# Patient Record
(demographics unavailable — no encounter records)

---

## 2024-12-24 NOTE — HISTORY OF PRESENT ILLNESS
[FreeTextEntry1] : Patient is a 55-year-old male who presents to the gastroenterology office for evaluation.  Patient reports he has been experiencing intermittent chest pain.  He reports having been seen in the hospital emergency department on multiple occasions for his chest pain.  He reports previously having undergone cardiac evaluation, which he reports were normal in the past.  He reports starting omeprazole 20 mg once a day and more recently famotidine 20 mg once a day at the advice of his primary care physician, which he obtained over-the-counter.  He reports there has been a partial improvement in his chest pain with the omeprazole and famotidine, however he has still had several episodes of breakthrough symptom of chest pain.  He denies abdominal pain, shortness of breath, nausea, vomiting, heartburn, regurgitation, difficulty swallowing or painful swallowing.  He denies acute changes in bowel habits.  He reports typically having 1-2 bowel movements per day.  He denies blood in stools and denies black stools.  He reports typically undergoing yearly Cologuard stool test ordered by his primary care physician for colon cancer screening.  He reports his most recent Cologuard stool test was completed in October 2024 and was normal.  He denies unintentional weight loss.  Past medical history: Patient reports history of hypertension. Patient reports history of hypothyroidism.

## 2024-12-24 NOTE — ASSESSMENT
[FreeTextEntry1] : Patient is a 55-year-old male with symptom of chest pain.  He reported previously having undergone cardiac evaluation, which he reported were normal in the past.  His chest pain has overall improved with over-the-counter omeprazole 20 mg once a day and famotidine 20 mg once a day, however he still has had several episodes of breakthrough symptom of chest pain.  From GI perspective, would recommend EGD (upper GI endoscopy) for evaluation.  Discussed indication, benefits/risks and alternatives to EGD with the patient.  He reported understanding and he is in agreement with proceeding with EGD.  All of his questions were answered. Will increase patient's omeprazole to 40 mg once a day at present time.  Patient advised to discontinue use of over-the-counter omeprazole 20 mg once a day and famotidine 20 mg once a day, since a prescription for omeprazole 40 mg once a day will be ordered to his pharmacy. Patient was counseled on GERD dietary/lifestyle modifications.       --Increase omeprazole to 40 mg once a day.        --Discontinue over-the-counter famotidine.       --Schedule EGD (upper GI endoscopy).       --GERD dietary/lifestyle modifications.

## 2024-12-24 NOTE — PHYSICAL EXAM
[Alert] : alert [No Acute Distress] : no acute distress [Sclera] : the sclera and conjunctiva were normal [Oropharynx] : the oropharynx was normal [Normal Appearance] : the appearance of the neck was normal [No Respiratory Distress] : no respiratory distress [No Acc Muscle Use] : no accessory muscle use [Respiration, Rhythm And Depth] : normal respiratory rhythm and effort [Heart Rate And Rhythm] : heart rate was normal and rhythm regular [Bowel Sounds] : normal bowel sounds [Abdomen Tenderness] : non-tender [Abdomen Soft] : soft [No CVA Tenderness] : no CVA  tenderness [Abnormal Walk] : normal gait [Normal Color / Pigmentation] : normal skin color and pigmentation [Oriented To Time, Place, And Person] : oriented to person, place, and time [Normal Affect] : the affect was normal [Normal Mood] : the mood was normal [Rebound Tenderness] : no rebound tenderness

## 2025-06-10 NOTE — PHYSICAL EXAM
[Alert] : alert [No Acute Distress] : no acute distress [Sclera] : the sclera and conjunctiva were normal [Normal Appearance] : the appearance of the neck was normal [No Respiratory Distress] : no respiratory distress [No Acc Muscle Use] : no accessory muscle use [Respiration, Rhythm And Depth] : normal respiratory rhythm and effort [Heart Rate And Rhythm] : heart rate was normal and rhythm regular [Abdomen Tenderness] : non-tender [Bowel Sounds] : normal bowel sounds [Abdomen Soft] : soft [Abnormal Walk] : normal gait [Normal Color / Pigmentation] : normal skin color and pigmentation [Oriented To Time, Place, And Person] : oriented to person, place, and time [Rebound Tenderness] : no rebound tenderness

## 2025-06-10 NOTE — ASSESSMENT
[FreeTextEntry1] : Patient is a 55-year-old male seen in the gastroenterology office today for follow-up.  Patient has history of chest pain, which is possibly secondary to GERD.  Patient was being treated with omeprazole.  More recently, patient experienced a recurrence in chest pain upon self-discontinuation of the omeprazole.  He has now resumed taking the omeprazole.  During office visit today, patient informed to take omeprazole 40 mg once a day on a consistent basis for now.  Patient was also counseled on GERD dietary/lifestyle modifications. Will also order an esophagram study for evaluation.  If chest pain persists or is uncontrolled with omeprazole in the future, will consider referral for esophageal manometry at that time.   His prior EGD from earlier this year revealed a tongue of salmon-colored mucosa in the distal esophagus suggestive of possible Harris's esophagus; however pathology results from biopsies were negative for Harris's esophagus.  Will plan for repeat EGD in 1 year (from time of prior EGD) with repeat biopsies from the tongue of salmon-colored mucosa in the distal esophagus to evaluate/check for Harris's esophagus. Prior colonoscopy earlier this year revealed one colon polyp which was removed, with pathology revealing the colon polyp to be a sessile serrated lesion/adenoma.  Will plan for repeat surveillance colonoscopy in 3 years.       --Schedule esophagram.       --Continue omeprazole 40 mg once a day.       --EGD due in January 2026.       --Colonoscopy due in January 2028       --GERD dietary/lifestyle modifications.

## 2025-06-10 NOTE — HISTORY OF PRESENT ILLNESS
[FreeTextEntry1] : Patient is a 55-year-old male who presents to the gastroenterology office for follow-up.  Patient with history of chest pain.  An EGD and colonoscopy were performed in January 2025 for evaluation.  EGD was performed for evaluation of chest pain.  Colonoscopy was performed for screening for colon cancer.  EGD revealed one tongue of salmon-colored mucosa in the distal esophagus; otherwise the esophagus was normal; patchy erythema in the gastric body and gastric antrum; 7 small polyps in the stomach which were removed; and a normal duodenal bulb/second part of duodenum.  Colonoscopy revealed 1 polyp in the ascending colon which was removed and small internal hemorrhoids.  Pathology results from biopsies taken from the proximal esophagus were unremarkable; biopsies from the mid-esophagus were unremarkable; biopsies from the tongue of salmon-colored mucosa in the distal esophagus revealed inflammation and were negative for Harris's esophagus; biopsies from the stomach revealed mild inflammation; the stomach polyps were fundic gland polyps; and biopsies from the duodenum revealed findings suggestive of inflammation.  Pathology results revealed the colon polyp to be a sessile serrated lesion/adenoma.  Patient was advised to increase his omeprazole to 40 mg twice a day for 2 months after the EGD procedure for treatment of the noted inflammation.  He was informed to reduce the omeprazole down to 40 mg once a day after 2 months.  A repeat EGD in 1 year was recommended for re-evaluation and repeat biopsies from the salmon colored mucosa in the distal esophagus to check for Harris's esophagus. A repeat surveillance colonoscopy was recommended in 3 years.  During office visit today, patient reports having recently self-discontinued the omeprazole and subsequently experiencing a recurrence in his chest pain.  He reports his primary care physician who is also his cardiologist Dr. Nader Dozier evaluated him and subsequently provided him with a new prescription for the omeprazole.  He reports Dr. Dozier evaluated his chest pain and informed him that his chest pain was not cardiac in etiology.  Patient reports he resumed taking the omeprazole several days ago.  He reports currently taking omeprazole 40 mg once a day.  He denies experiencing any acid reflux/regurgitation or heartburn.  He denies experiencing abdominal pain, shortness of breath, nausea, vomiting, difficulty swallowing or painful swallowing.  He denies any acute changes in bowel habits.  He denies diarrhea or constipation.  He denies unintentional weight loss.  Past medical history: Patient reports history of hypertension. Patient reports history of hypothyroidism.